# Patient Record
Sex: MALE | Employment: OTHER | URBAN - METROPOLITAN AREA
[De-identification: names, ages, dates, MRNs, and addresses within clinical notes are randomized per-mention and may not be internally consistent; named-entity substitution may affect disease eponyms.]

---

## 2021-11-17 ENCOUNTER — OFFICE VISIT (OUTPATIENT)
Dept: INTERNAL MEDICINE | Facility: CLINIC | Age: 71
End: 2021-11-17
Payer: COMMERCIAL

## 2021-11-17 ENCOUNTER — TELEPHONE (OUTPATIENT)
Dept: FAMILY MEDICINE | Facility: CLINIC | Age: 71
End: 2021-11-17

## 2021-11-17 VITALS
HEIGHT: 68 IN | WEIGHT: 188.5 LBS | HEART RATE: 106 BPM | DIASTOLIC BLOOD PRESSURE: 93 MMHG | TEMPERATURE: 98 F | SYSTOLIC BLOOD PRESSURE: 149 MMHG | OXYGEN SATURATION: 99 % | BODY MASS INDEX: 28.57 KG/M2

## 2021-11-17 DIAGNOSIS — G91.9 HYDROCEPHALUS WITH OPERATING SHUNT (H): ICD-10-CM

## 2021-11-17 DIAGNOSIS — R03.0 ELEVATED BLOOD PRESSURE READING WITHOUT DIAGNOSIS OF HYPERTENSION: ICD-10-CM

## 2021-11-17 DIAGNOSIS — Z13.6 ENCOUNTER FOR ABDOMINAL AORTIC ANEURYSM (AAA) SCREENING: ICD-10-CM

## 2021-11-17 DIAGNOSIS — R26.9 GAIT DIFFICULTY: ICD-10-CM

## 2021-11-17 DIAGNOSIS — Z98.2 VP (VENTRICULOPERITONEAL) SHUNT STATUS: ICD-10-CM

## 2021-11-17 DIAGNOSIS — N40.1 BENIGN PROSTATIC HYPERPLASIA WITH URINARY FREQUENCY: ICD-10-CM

## 2021-11-17 DIAGNOSIS — Z86.0100 HISTORY OF COLONIC POLYPS: ICD-10-CM

## 2021-11-17 DIAGNOSIS — R73.03 PREDIABETES: Primary | ICD-10-CM

## 2021-11-17 DIAGNOSIS — U07.1 COVID-19: ICD-10-CM

## 2021-11-17 DIAGNOSIS — Z12.11 COLON CANCER SCREENING: ICD-10-CM

## 2021-11-17 DIAGNOSIS — Z00.00 PREVENTATIVE HEALTH CARE: ICD-10-CM

## 2021-11-17 DIAGNOSIS — R35.0 BENIGN PROSTATIC HYPERPLASIA WITH URINARY FREQUENCY: ICD-10-CM

## 2021-11-17 PROCEDURE — 91300 COVID-19,PF,PFIZER (12+ YRS): CPT | Performed by: PEDIATRICS

## 2021-11-17 PROCEDURE — 0004A COVID-19,PF,PFIZER (12+ YRS): CPT | Performed by: PEDIATRICS

## 2021-11-17 PROCEDURE — 99203 OFFICE O/P NEW LOW 30 MIN: CPT | Mod: 25 | Performed by: PEDIATRICS

## 2021-11-17 RX ORDER — TAMSULOSIN HYDROCHLORIDE 0.4 MG/1
0.4 CAPSULE ORAL DAILY
COMMUNITY

## 2021-11-17 ASSESSMENT — PAIN SCALES - GENERAL: PAINLEVEL: NO PAIN (0)

## 2021-11-17 ASSESSMENT — MIFFLIN-ST. JEOR: SCORE: 1575.04

## 2021-11-17 NOTE — Clinical Note
Please help schedule neurosurgery, urology, and MRI.   Please ensure neurosurgery happens AFTER the MRI.

## 2021-11-17 NOTE — PROGRESS NOTES
"Dear patient. Thank you for visiting with me. I want you to feel respected, understood, and empowered. \"Respect\" is valuing you as much as I would a close family member. \"Empowerment\" happens when you are fully informed, and can make the best possible decision for you.  Please ask me questions!  Challenge anything that is not clear.    Medical records are primarily used as memory aids for me and my colleagues. Things to know about my documentation style:  - The 'problem list' includes current symptoms or diagnoses, and some problems that are resolved but may return. I use the past medical history for problems not expected to return.  - I use single quotation marks for things that you or I said, when I want to clarify who was speaking.  - I use double quotation marks when copying a term from elsewhere in your records. Italics (besides here) may also denote a quotation.  If you have questions or concerns, please contact me; I will reply as soon as time allows.    This visit was conducted with the assistance of an  - family member used with patient consent because of unavailability for technical or timing reason      Context    Aneta Horn is a 70 year old man, here with his daughter, with concerns including:  Chief Complaint   Patient presents with     Establish Care     no outside records     PCP: Vincent Jaramillo MD   Visit type: overview of problems, with several issues identified by patient discussed within limits of available time    BP (!) 149/93 (BP Location: Left arm, Patient Position: Sitting, Cuff Size: Adult Regular)   Pulse 106   Temp 98  F (36.7  C) (Oral)   Ht 1.72 m (5' 7.72\")   Wt 85.5 kg (188 lb 8 oz)   SpO2 99%   BMI 28.90 kg/m      Problems and progress      Hydrocephalus   Shunt status (placed in 2010)  Complications after having a colon polyp removed - he was unable to walk or move afterwards and was found to have 'a problem with water being removed.'  Attempt to " get records  Neurosurgery referral for assessment  Will do CT.        Prostate enlargement.  'Going to the bathroom constantly' with frequent urination, 2-3 times per night even despite tamsulosin  Given volume of new issues, recommend urologist evaluation.      Prediabetes treated with metformin  Will check labs        Small hernia, not time to examine today        Preventive health care  Will look at shot records. Daughter think he needs shingles and the COVID booster.  Pfizer 3/13/21 and 4/16/21  Flu shot last month      Outstanding issues (Dr. Jaramillo)  --------------------------------------------  -- referrals to urology, neurosurgery      Other comments      Other physical exam  Physical Exam  Constitutional:       Appearance: Normal appearance.   HENT:      Head: Normocephalic.      Right Ear: External ear normal.      Left Ear: External ear normal.      Nose: Nose normal.   Eyes:      General: No scleral icterus.        Right eye: No discharge.         Left eye: No discharge.      Extraocular Movements: Extraocular movements intact.   Pulmonary:      Effort: Pulmonary effort is normal. No respiratory distress.      Breath sounds: No stridor. No wheezing.   Skin:     Findings: No rash.   Neurological:      Mental Status: He is alert.   Psychiatric:         Mood and Affect: Mood normal.         Behavior: Behavior normal.         Thought Content: Thought content normal.         Judgment: Judgment normal.            About this visit:  Time note ((n3, 30'): The total time (on the date of service) for this service was 35 minutes, including discussion/face-to-face, chart review, interpretation not otherwise reported, documentation, and updating of the computerized record.  Comment about data reviewed: I personally reviewed, interpreted, and/or confirmed interpretation of Urdu-language records from outside

## 2021-11-17 NOTE — NURSING NOTE
Chief Complaint   Patient presents with     Establish Care     no outside records       ENRIQUE Martinez at 1:02 PM on 11/17/2021.

## 2021-11-17 NOTE — PROGRESS NOTES
PRIMARY [facility]ARE [facility]ENTER     Patient Name: Aneta Horn  YOB: 1950  MRN: [id number]    Date of Service: November 17, 2021  Chief Complaint: ***                ASSESSMENT & PLAN:     There are no diagnoses linked to this encounter.    #Healthcare Maintenance:  ***    Return to clinic: ***    Patient care plan discussed with attending physician,  ***, who agreed with above.    Carlee Ramirez MD   Internal Medicine - PGY1   Pager # [phone]           HISTORY OF PRESENT ILLNESS:    ***      Medications and allergies reviewed by me today.          PAST MEDICAL HISTORY:   No past medical history on file.         REVIEW OF SYSTEMS:     10 point ROS was negative except as noted in HPI         HOME MEDICATIONS:     No current outpatient medications on file.     No current facility-administered medications for this visit.            PHYSICAL EXAM:   Vitals: There were no vitals taken for this visit.    Wt Readings from Last 4 Encounters:   No data found for Wt       GENERAL: Alert, interactive, NAD  HEENT: Normocephalic, atraumatic, PERRL, EOMI, no conjunctivitis, anicteric sclera, tympanic membranes translucent with normal light reflex, no cervical LAD, no thyromegaly, no oropharyngeal lesions or erythema  LUNGS: clear to auscultation bilaterally, no crackles or wheezes  HEART: regular rate and rhythm, normal S1 and S2, no murmur appreciated  ABDOMEN: Soft, nontender, nondistended. +BS, no HSM or masses, no rebound or guarding.  MUSCULOSKELETAL: no tenderness to spinous processes, no chest wall tenderness  EXTREMITIES: No LE edema bilaterally, 2+ DP and radial pulses bialterally  SKIN: Warm and dry, no jaundice or acute rashes  NEURO: CN II-XII intact, 5/5 upper and lower extremity strength bilaterally, 2+ biceps and patellar reflexes bilaterally, no dysmetria bilaterally, sensation intact, normal gait  PSYCH: A&O to person, place and time. appropriate mood, normal speech, linear thought.             DATA:     Laboratory Data:  ***  Imaging:  ***

## 2021-11-18 ENCOUNTER — TELEPHONE (OUTPATIENT)
Dept: INTERNAL MEDICINE | Facility: CLINIC | Age: 71
End: 2021-11-18

## 2021-11-18 ENCOUNTER — LAB (OUTPATIENT)
Dept: LAB | Facility: CLINIC | Age: 71
End: 2021-11-18
Payer: COMMERCIAL

## 2021-11-18 DIAGNOSIS — R03.0 ELEVATED BLOOD PRESSURE READING WITHOUT DIAGNOSIS OF HYPERTENSION: ICD-10-CM

## 2021-11-18 DIAGNOSIS — R26.9 GAIT DIFFICULTY: ICD-10-CM

## 2021-11-18 DIAGNOSIS — R73.03 PREDIABETES: ICD-10-CM

## 2021-11-18 DIAGNOSIS — E78.5 DYSLIPIDEMIA ASSOCIATED WITH TYPE 2 DIABETES MELLITUS (H): Primary | ICD-10-CM

## 2021-11-18 DIAGNOSIS — Z86.0100 HISTORY OF COLONIC POLYPS: ICD-10-CM

## 2021-11-18 DIAGNOSIS — G91.9 HYDROCEPHALUS, UNSPECIFIED TYPE (H): ICD-10-CM

## 2021-11-18 DIAGNOSIS — E11.69 DYSLIPIDEMIA ASSOCIATED WITH TYPE 2 DIABETES MELLITUS (H): Primary | ICD-10-CM

## 2021-11-18 PROBLEM — Z00.00 PREVENTATIVE HEALTH CARE: Status: ACTIVE | Noted: 2021-11-18

## 2021-11-18 PROBLEM — N40.1 BENIGN PROSTATIC HYPERPLASIA WITH URINARY FREQUENCY: Status: ACTIVE | Noted: 2021-11-18

## 2021-11-18 PROBLEM — R35.0 BENIGN PROSTATIC HYPERPLASIA WITH URINARY FREQUENCY: Status: ACTIVE | Noted: 2021-11-18

## 2021-11-18 PROBLEM — Z98.2 VP (VENTRICULOPERITONEAL) SHUNT STATUS: Status: ACTIVE | Noted: 2021-11-18

## 2021-11-18 PROBLEM — K46.9: Status: ACTIVE | Noted: 2021-11-18

## 2021-11-18 PROBLEM — Z12.11 COLON CANCER SCREENING: Status: ACTIVE | Noted: 2021-11-18

## 2021-11-18 LAB
ALBUMIN SERPL-MCNC: 3.8 G/DL (ref 3.4–5)
ALP SERPL-CCNC: 81 U/L (ref 40–150)
ALT SERPL W P-5'-P-CCNC: 26 U/L (ref 0–70)
ANION GAP SERPL CALCULATED.3IONS-SCNC: 8 MMOL/L (ref 3–14)
AST SERPL W P-5'-P-CCNC: 19 U/L (ref 0–45)
BASOPHILS # BLD AUTO: 0 10E3/UL (ref 0–0.2)
BASOPHILS NFR BLD AUTO: 0 %
BILIRUB SERPL-MCNC: 0.8 MG/DL (ref 0.2–1.3)
BUN SERPL-MCNC: 15 MG/DL (ref 7–30)
CALCIUM SERPL-MCNC: 8.7 MG/DL (ref 8.5–10.1)
CHLORIDE BLD-SCNC: 104 MMOL/L (ref 94–109)
CHOLEST SERPL-MCNC: 201 MG/DL
CO2 SERPL-SCNC: 28 MMOL/L (ref 20–32)
CREAT SERPL-MCNC: 0.83 MG/DL (ref 0.66–1.25)
EOSINOPHIL # BLD AUTO: 0.2 10E3/UL (ref 0–0.7)
EOSINOPHIL NFR BLD AUTO: 4 %
ERYTHROCYTE [DISTWIDTH] IN BLOOD BY AUTOMATED COUNT: 13.2 % (ref 10–15)
FASTING STATUS PATIENT QL REPORTED: ABNORMAL
GFR SERPL CREATININE-BSD FRML MDRD: 88 ML/MIN/1.73M2
GLUCOSE BLD-MCNC: 140 MG/DL (ref 70–99)
HBA1C MFR BLD: 6 % (ref 0–5.6)
HCT VFR BLD AUTO: 47.3 % (ref 40–53)
HDLC SERPL-MCNC: 38 MG/DL
HEMOCCULT STL QL IA: NEGATIVE
HGB BLD-MCNC: 15.7 G/DL (ref 13.3–17.7)
IMM GRANULOCYTES # BLD: 0 10E3/UL
IMM GRANULOCYTES NFR BLD: 0 %
LDLC SERPL CALC-MCNC: 132 MG/DL
LYMPHOCYTES # BLD AUTO: 1.4 10E3/UL (ref 0.8–5.3)
LYMPHOCYTES NFR BLD AUTO: 25 %
MCH RBC QN AUTO: 32.4 PG (ref 26.5–33)
MCHC RBC AUTO-ENTMCNC: 33.2 G/DL (ref 31.5–36.5)
MCV RBC AUTO: 98 FL (ref 78–100)
MONOCYTES # BLD AUTO: 0.5 10E3/UL (ref 0–1.3)
MONOCYTES NFR BLD AUTO: 9 %
NEUTROPHILS # BLD AUTO: 3.6 10E3/UL (ref 1.6–8.3)
NEUTROPHILS NFR BLD AUTO: 62 %
NONHDLC SERPL-MCNC: 163 MG/DL
NRBC # BLD AUTO: 0 10E3/UL
NRBC BLD AUTO-RTO: 0 /100
PLATELET # BLD AUTO: 218 10E3/UL (ref 150–450)
POTASSIUM BLD-SCNC: 3.6 MMOL/L (ref 3.4–5.3)
PROT SERPL-MCNC: 7.7 G/DL (ref 6.8–8.8)
RBC # BLD AUTO: 4.85 10E6/UL (ref 4.4–5.9)
SODIUM SERPL-SCNC: 140 MMOL/L (ref 133–144)
TRIGL SERPL-MCNC: 156 MG/DL
TSH SERPL DL<=0.005 MIU/L-ACNC: 1.92 MU/L (ref 0.4–4)
WBC # BLD AUTO: 5.8 10E3/UL (ref 4–11)

## 2021-11-18 PROCEDURE — 36415 COLL VENOUS BLD VENIPUNCTURE: CPT | Performed by: PATHOLOGY

## 2021-11-18 PROCEDURE — 82274 ASSAY TEST FOR BLOOD FECAL: CPT | Performed by: PEDIATRICS

## 2021-11-18 PROCEDURE — 80050 GENERAL HEALTH PANEL: CPT | Performed by: PATHOLOGY

## 2021-11-18 PROCEDURE — 83036 HEMOGLOBIN GLYCOSYLATED A1C: CPT | Performed by: PATHOLOGY

## 2021-11-18 PROCEDURE — 80061 LIPID PANEL: CPT | Performed by: PATHOLOGY

## 2021-11-18 RX ORDER — ATORVASTATIN CALCIUM 10 MG/1
TABLET, FILM COATED ORAL
Qty: 150 TABLET | Refills: 0 | Status: SHIPPED | OUTPATIENT
Start: 2021-11-18 | End: 2022-02-16

## 2021-11-18 NOTE — LETTER
"Aneta Horn  [address]   [place] [address]       11/18/2021       Susy, Mr. Horn,    It was good to meet you and your daughter yesterday. I am writing with comments about your lab results.      Stool test for blood was negative. This means cancer is unlikely. I recommend getting this done every year.    Hemoglobin a1c was slightly elevated at 6. This is still in the \"prediabetes\" range - but with your metformin, it suggests you have reached the level of diabetes.  I recommend:       1. In-person visit every 6 months, for blood tests, a urine test, and foot exam       2. Exam by an eye doctor every year       3. See a dentist every 12 months (or 6 months if the dentist recommends)       4. Meet with a dietitian    We did a LIPID PANEL. Here are the most recent results:  Recent Labs   Lab Test 11/18/21  0902   CHOL 201*   HDL 38*   *   TRIG 156*      My understanding is that you had fasted for at least 8-12 hours before this blood draw.    What do these results mean?    For persons aged 40-79, lipid tests are interpreted using the \"risk calculator\" from the American Heart Association and the American College of Cardiology (http://www.cvriskcalculator.com)    Your 10-year risk of heart disease or stroke (based on your age, gender, and risk factors above) is 53.9%    It is important to work on heart-healthy diet, exercise, and weight management. Let's talk about how to do this.    I recommend:    Guidelines suggest you should be taking a \"moderate to high intensity\" statin to reduce your risk for cardiovascular disease. (this includes atorvastatin 20-40 mg)   I enclosed a paper prescription. If you have concerns about starting a new medication, please make an appointment as soon as possible.      Either way, we should make another appointment in 3 months.      Other normal labs  -- Thyroid  -- Blood count      Sincerely,      Chuckie Jaramillo MD  Internal Medicine & Pediatrics  Complex " primary care  [facility]

## 2021-11-18 NOTE — ASSESSMENT & PLAN NOTE
Preventive health care  Will look at shot records. Daughter think he needs shingles and the COVID booster.  Pfizer 3/13/21 and 4/16/21  Flu shot last month

## 2021-11-18 NOTE — ASSESSMENT & PLAN NOTE
Prostate enlargement.  'Going to the bathroom constantly' with frequent urination, 2-3 times per night even despite tamsulosin  Given volume of new issues, recommend urologist evaluation.

## 2021-11-18 NOTE — ASSESSMENT & PLAN NOTE
Hydrocephalus   Shunt status (placed in 2010)  Complications after having a colon polyp removed - he was unable to walk or move afterwards and was found to have 'a problem with water being removed.'  Attempt to get records  Neurosurgery referral for assessment  Will do CT

## 2021-11-18 NOTE — CONFIDENTIAL NOTE
"Sent the following:      Susy, Mr. Horn,    It was good to meet you and your daughter yesterday. I am writing with comments about your lab results.      Stool test for blood was negative. This means cancer is unlikely. I recommend getting this done every year.    Hemoglobin a1c was slightly elevated at 6. This is still in the \"prediabetes\" range - but with your metformin, it suggests you have reached the level of diabetes.  I recommend:       1. In-person visit every 6 months, for blood tests, a urine test, and foot exam       2. Exam by an eye doctor every year       3. See a dentist every 12 months (or 6 months if the dentist recommends)       4. Meet with a dietitian    We did a LIPID PANEL. Here are the most recent results:  Recent Labs   Lab Test 11/18/21  0902   CHOL 201*   HDL 38*   *   TRIG 156*      My understanding is that you had fasted for at least 8-12 hours before this blood draw.    What do these results mean?    For persons aged 40-79, lipid tests are interpreted using the \"risk calculator\" from the American Heart Association and the American College of Cardiology (http://www.cvriskcalculator.com)    Your 10-year risk of heart disease or stroke (based on your age, gender, and risk factors above) is 53.9%    It is important to work on heart-healthy diet, exercise, and weight management. Let's talk about how to do this.    I recommend:    Guidelines suggest you should be taking a \"moderate to high intensity\" statin to reduce your risk for cardiovascular disease.        (this includes atorvastatin 20-40 mg)    We also need to follow-up your blood pressure.    We should make another appointment in 2-3 months.      Other normal labs  -- Thyroid  -- Blood count      Sincerely,      Chuckie Jaramillo MD  Internal Medicine & Pediatrics  Complex primary care  [facility], [facility]                 "

## 2021-11-19 ENCOUNTER — TELEPHONE (OUTPATIENT)
Dept: NEUROSURGERY | Facility: CLINIC | Age: 71
End: 2021-11-19
Payer: COMMERCIAL

## 2021-11-19 NOTE — TELEPHONE ENCOUNTER
RECORDS RECEIVED FROM: Internal   REASON FOR VISIT: Hydrocephalus with operating shunt/  (ventriculoperitoneal) shunt status   Date of Appt: 12/7/21   NOTES (FOR ALL VISITS) STATUS DETAILS   OFFICE NOTE from referring provider Internal Dr Vincent Jaramillo @ [facility]:  11/17/21   OFFICE NOTE from other specialist N/A    DISCHARGE SUMMARY from hospital N/A    DISCHARGE REPORT from the ER N/A    OPERATIVE REPORT N/A    MEDICATION LIST Internal    IMAGING  (FOR ALL VISITS)     EMG N/A    EEG N/A    LUMBAR PUNCTURE N/A    DAMASO SCAN N/A    ULTRASOUND (CAROTID BILAT) *VASCULAR* N/A    MRI (HEAD, NECK, SPINE) Internal [facility]:  MRI Brain 11/30/21   CT (HEAD, NECK, SPINE) N/A

## 2021-11-23 ENCOUNTER — TELEPHONE (OUTPATIENT)
Dept: FAMILY MEDICINE | Facility: CLINIC | Age: 71
End: 2021-11-23
Payer: COMMERCIAL

## 2021-11-23 DIAGNOSIS — Z01.00 ENCOUNTER FOR ROUTINE EYE AND VISION EXAMINATION: Primary | ICD-10-CM

## 2021-11-23 NOTE — TELEPHONE ENCOUNTER
"RN received message from scheduling:    \"patient's daughter wants to know what can help patient with his eyes consistently watery\"    RN called patient with help from a , and gave the scheduling number for [facility] clinic.  RN let her know that a referral for a routine eye exam will be placed.    Oliva Lyn RN on 11/23/2021 at 9:01 AM      "

## 2021-11-24 ENCOUNTER — ANCILLARY PROCEDURE (OUTPATIENT)
Dept: ULTRASOUND IMAGING | Facility: CLINIC | Age: 71
End: 2021-11-24
Attending: PEDIATRICS
Payer: COMMERCIAL

## 2021-11-24 ENCOUNTER — TELEPHONE (OUTPATIENT)
Dept: INTERNAL MEDICINE | Facility: CLINIC | Age: 71
End: 2021-11-24

## 2021-11-24 DIAGNOSIS — Z13.6 ENCOUNTER FOR ABDOMINAL AORTIC ANEURYSM (AAA) SCREENING: ICD-10-CM

## 2021-11-24 DIAGNOSIS — Z00.00 PREVENTATIVE HEALTH CARE: ICD-10-CM

## 2021-11-24 DIAGNOSIS — K80.20 GALLSTONES: ICD-10-CM

## 2021-11-24 PROCEDURE — 76706 US ABDL AORTA SCREEN AAA: CPT | Performed by: RADIOLOGY

## 2021-11-24 NOTE — LETTER
Aneta Horn  [address]   [place] [address]     11/24/2021     Hello,    Thank you for getting your ultrasound done.    I'm happy to report: there were no signs of an abdominal aortic aneurysm.    Incidentally, the ultrasound saw some gallstones, but they aren't causing any trouble or inflammation.     There is no need to have this test done again.    Chuckie Jaramillo MD  Internal Medicine & Pediatrics  Complex primary care  [facility], [facility] [place]

## 2021-11-25 NOTE — CONFIDENTIAL NOTE
Sent the following      Hello,    Thank you for getting your ultrasound done.    I'm happy to report: there were no signs of an abdominal aortic aneurysm.    Incidentally, the ultrasound saw some gallstones, but they aren't causing any trouble or inflammation.     There is no need to have this test done again.    Chuckie Jaramillo MD  Internal Medicine & Pediatrics  Complex primary care  [facility], [facility] [place]

## 2021-12-07 ENCOUNTER — PRE VISIT (OUTPATIENT)
Dept: NEUROSURGERY | Facility: CLINIC | Age: 71
End: 2021-12-07

## 2021-12-12 ENCOUNTER — HEALTH MAINTENANCE LETTER (OUTPATIENT)
Age: 71
End: 2021-12-12

## 2021-12-28 ENCOUNTER — PRE VISIT (OUTPATIENT)
Dept: UROLOGY | Facility: CLINIC | Age: 71
End: 2021-12-28
Payer: COMMERCIAL

## 2021-12-28 NOTE — TELEPHONE ENCOUNTER
MEDICAL RECORDS REQUEST   [facility]  Urology Clinic  [address] [place] [address]  PHONE: [phone]  Fax: [phone]        FUTURE VISIT INFORMATION                                                   Aneta Kory, : 1950 scheduled for future visit at [facility]    APPOINTMENT INFORMATION:    Date: 2022    Provider: Ayaan Dyer MD    Reason for Visit/Diagnosis: Benign prostatic hyperplasia with urinary frequency    REFERRAL INFORMATION:    Referring provider:  Vincent Jaramillo MD    Specialty: N/A    Referring providers clinic:  [facility]    Clinic contact number:  N/A    RECORDS REQUESTED FOR VISIT                                                     NOTES  STATUS/DETAILS   OFFICE NOTE from referring provider  yes, 2021   OFFICE NOTE from other specialist  no   DISCHARGE SUMMARY from hospital  no   DISCHARGE REPORT from the ER  no   OPERATIVE REPORT  no   MEDICATION LIST  yes   LABS     URINALYSIS (UA)  no   URINE CYTOLOGY  no   PSA (LAB)  no     PRE-VISIT CHECKLIST      Record collection complete Yes   Appointment appropriately scheduled           (right time/right provider) Yes   Joint diagnostic appointment coordinated correctly          (ensure right order & amount of time) Yes   MyChart activation Yes   Questionnaire complete If no, please explain pending

## 2022-03-15 ENCOUNTER — PRE VISIT (OUTPATIENT)
Dept: UROLOGY | Facility: CLINIC | Age: 72
End: 2022-03-15
Payer: COMMERCIAL

## 2022-03-15 NOTE — TELEPHONE ENCOUNTER
Reason for visit: consult      Dx/Hx/Sx: BPH w/urinary frequency with flomax    Records/imaging/labs/orders: in epic    At Rooming: standard

## 2022-04-03 ENCOUNTER — HEALTH MAINTENANCE LETTER (OUTPATIENT)
Age: 72
End: 2022-04-03

## 2022-07-24 ENCOUNTER — HEALTH MAINTENANCE LETTER (OUTPATIENT)
Age: 72
End: 2022-07-24

## 2022-10-03 ENCOUNTER — HEALTH MAINTENANCE LETTER (OUTPATIENT)
Age: 72
End: 2022-10-03

## 2023-02-11 ENCOUNTER — HEALTH MAINTENANCE LETTER (OUTPATIENT)
Age: 73
End: 2023-02-11

## 2023-05-20 ENCOUNTER — HEALTH MAINTENANCE LETTER (OUTPATIENT)
Age: 73
End: 2023-05-20

## 2023-10-22 ENCOUNTER — HEALTH MAINTENANCE LETTER (OUTPATIENT)
Age: 73
End: 2023-10-22

## 2024-03-09 ENCOUNTER — HEALTH MAINTENANCE LETTER (OUTPATIENT)
Age: 74
End: 2024-03-09

## 2024-07-27 ENCOUNTER — HEALTH MAINTENANCE LETTER (OUTPATIENT)
Age: 74
End: 2024-07-27

## 2024-12-14 ENCOUNTER — HEALTH MAINTENANCE LETTER (OUTPATIENT)
Age: 74
End: 2024-12-14